# Patient Record
Sex: FEMALE | Race: WHITE
[De-identification: names, ages, dates, MRNs, and addresses within clinical notes are randomized per-mention and may not be internally consistent; named-entity substitution may affect disease eponyms.]

---

## 2020-01-25 ENCOUNTER — HOSPITAL ENCOUNTER (EMERGENCY)
Dept: HOSPITAL 97 - ER | Age: 40
Discharge: ANOTHER HOSPITAL | End: 2020-01-25
Payer: SELF-PAY

## 2020-01-25 VITALS — OXYGEN SATURATION: 100 %

## 2020-01-25 VITALS — DIASTOLIC BLOOD PRESSURE: 62 MMHG | SYSTOLIC BLOOD PRESSURE: 115 MMHG

## 2020-01-25 VITALS — TEMPERATURE: 97.6 F

## 2020-01-25 DIAGNOSIS — S52.301B: Primary | ICD-10-CM

## 2020-01-25 DIAGNOSIS — S52.251A: ICD-10-CM
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
## 2020-01-25 LAB
ALBUMIN SERPL BCP-MCNC: 4.1 G/DL (ref 3.4–5)
ALP SERPL-CCNC: 57 U/L (ref 45–117)
ALT SERPL W P-5'-P-CCNC: 26 U/L (ref 12–78)
AST SERPL W P-5'-P-CCNC: 16 U/L (ref 15–37)
BUN BLD-MCNC: 15 MG/DL (ref 7–18)
GLUCOSE SERPLBLD-MCNC: 111 MG/DL (ref 74–106)
HCT VFR BLD CALC: 40.4 % (ref 36–45)
LYMPHOCYTES # SPEC AUTO: 1 K/UL (ref 0.7–4.9)
PMV BLD: 8.1 FL (ref 7.6–11.3)
POTASSIUM SERPL-SCNC: 3.5 MMOL/L (ref 3.5–5.1)
RBC # BLD: 4.68 M/UL (ref 3.86–4.86)

## 2020-01-25 PROCEDURE — 85025 COMPLETE CBC W/AUTO DIFF WBC: CPT

## 2020-01-25 PROCEDURE — 90714 TD VACC NO PRESV 7 YRS+ IM: CPT

## 2020-01-25 PROCEDURE — 36415 COLL VENOUS BLD VENIPUNCTURE: CPT

## 2020-01-25 PROCEDURE — 96365 THER/PROPH/DIAG IV INF INIT: CPT

## 2020-01-25 PROCEDURE — 96375 TX/PRO/DX INJ NEW DRUG ADDON: CPT

## 2020-01-25 PROCEDURE — 96361 HYDRATE IV INFUSION ADD-ON: CPT

## 2020-01-25 PROCEDURE — 80053 COMPREHEN METABOLIC PANEL: CPT

## 2020-01-25 PROCEDURE — 99285 EMERGENCY DEPT VISIT HI MDM: CPT
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
## 2020-01-25 NOTE — RAD REPORT
EXAM DESCRIPTION:  RAD - Elbow Right 3 View - 1/25/2020 4:14 pm

 

CLINICAL HISTORY:  open wrist fx, elbow pain

Elbow pain

 

COMPARISON:  No comparisons

 

FINDINGS:  A mild subluxation without dislocation is likely present at the radiocapitellar joint. The
 elbow joint is otherwise intact. Partially visualized both-bone forearm fracture again seen.
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
## 2020-01-25 NOTE — RAD REPORT
EXAM DESCRIPTION:  RAD - Wrist Right 3 View - 1/25/2020 3:33 pm

 

CLINICAL HISTORY:  open fx

Pain

 

COMPARISON:  No comparisons

 

FINDINGS:  Moderately displaced overriding fracture distal shaft of the radius is seen.  Mildly commi
nuted fracture of the ulna is present midshaft. Large laceration is seen distal forearm with air in t
he fascia planes small bony fragments. Disruption of the distal radioulnar joint is seen. Radiocapite
llar articulation appears preserved.